# Patient Record
Sex: MALE | Race: WHITE | URBAN - METROPOLITAN AREA
[De-identification: names, ages, dates, MRNs, and addresses within clinical notes are randomized per-mention and may not be internally consistent; named-entity substitution may affect disease eponyms.]

---

## 2018-03-21 ENCOUNTER — TELEPHONE (OUTPATIENT)
Dept: DERMATOLOGY | Facility: CLINIC | Age: 28
End: 2018-03-21

## 2021-06-01 NOTE — PROGRESS NOTES
Assessment & Plan       ICD-10-CM    1. Elevated blood pressure reading without diagnosis of hypertension  R03.0 **Comprehensive metabolic panel FUTURE anytime     CBC with platelets     TSH with free T4 reflex     Hemoglobin A1c   2. Acute non intractable tension-type headache  G44.209      Differential diagnosis includes viral syndrome, sinusitis, headache such as tension or migrainous (most likely cause due to his high BP today). Other less likely ddx includes subarachnoid or epidural hemorrhage, CNS tumor, hydrocephalus, meningitis viral or bacterial, headache such as tension or migrainous, temporal arteritis, vertebral artery dissection, stroke, amongst others. Intercranial lesion or meninginitis unlikley given lack of neck stiffness and lack of localizing neurological signs and symptoms.  Subarachnoid or epidural hemorrhage is less likely due to the lack of recent trauma and not being on chronic anticoagulation. The patient was advised to continue with OTC medications for symptomatic relief. Present to the emergency room if symptoms persists despite medications or he noticed neurological symptoms.   For blood pressure, weight loss, dietary restrictions of sodium and increasing his physical activity was recommended.    - Comprehensive metabolic panel FUTURE anytime  - CBC with platelets  - TSH with free T4 reflex  - Hemoglobin A1c    Return in about 3 months (around 9/3/2021) for Physical Exam.    Ishmael Painting MD  [facility]    Deysi Mcclain is a 31 year old who presents for the following health issues     HPI     Headache  Onset/Duration: x2 weeks.  Description  Location: unilateral in the right side - radiates from top of head to occipital area to neck  Character: throbbing pain  Frequency:  2-3 times a day    Duration:  1-2 hours  Wake with headaches: YES- sometimes  Able to do daily activities when headache present: YES  Intensity:  mild  Progression of Symptoms:  same,  "intermittent  Accompanying signs and symptoms:  Stiff neck: YES  Neck or upper back pain: no  Sinus or URI symptoms no   Fever: no  Nausea or vomiting: no  Dizziness: no  Numbness/tingling: no  Weakness: no  Visual changes: none  History  Head trauma: no  Family history of migraines: YES- mother  Daily pain medication use: no  Previous tests for headaches: no  Neurologist evaluation: no  Precipitating or Alleviating factors (light/sound/sleep/caffeine): none  Therapies tried and outcome: Ibuprofen (Advil, Motrin),            Outcome - usually effective  Frequent/daily pain medication use: no  Pain is on the scale of 5-6/10.    Work has been stressful but this is not usual.     Gained 15 lbs.     Review of Systems   Constitutional, HEENT, cardiovascular, pulmonary, gi and gu systems are negative, except as otherwise noted.      Objective    /88 (Patient Position: Sitting, Cuff Size: Adult Regular)   Pulse 76   Temp 96.8  F (36  C) (Tympanic)   Resp 20   Ht 1.791 m (5' 10.5\")   Wt 88.8 kg (195 lb 12.8 oz)   SpO2 99%   BMI 27.70 kg/m    Body mass index is 27.7 kg/m .  Physical Exam   GENERAL: healthy, alert and no distress  RESP: lungs clear to auscultation - no rales, rhonchi or wheezes  CV: regular rate and rhythm, normal S1 S2, no S3 or S4, no murmur, click or rub, no peripheral edema and peripheral pulses strong  MS: no gross musculoskeletal defects noted, no edema    Results for orders placed or performed in visit on 06/03/21   **Comprehensive metabolic panel FUTURE anytime     Status: None   Result Value Ref Range    Sodium 138 133 - 144 mmol/L    Potassium 4.3 3.4 - 5.3 mmol/L    Chloride 103 94 - 109 mmol/L    Carbon Dioxide 29 20 - 32 mmol/L    Anion Gap 6 3 - 14 mmol/L    Glucose 88 70 - 99 mg/dL    Urea Nitrogen 10 7 - 30 mg/dL    Creatinine 0.94 0.66 - 1.25 mg/dL    GFR Estimate >90 >60 mL/min/[1.73_m2]    GFR Estimate If Black >90 >60 mL/min/[1.73_m2]    Calcium 9.3 8.5 - 10.1 mg/dL    " Bilirubin Total 1.0 0.2 - 1.3 mg/dL    Albumin 4.3 3.4 - 5.0 g/dL    Protein Total 7.7 6.8 - 8.8 g/dL    Alkaline Phosphatase 81 40 - 150 U/L    ALT 24 0 - 70 U/L    AST 14 0 - 45 U/L   CBC with platelets     Status: Abnormal   Result Value Ref Range    WBC 3.9 (L) 4.0 - 11.0 10e9/L    RBC Count 5.32 4.4 - 5.9 10e12/L    Hemoglobin 16.5 13.3 - 17.7 g/dL    Hematocrit 49.1 40.0 - 53.0 %    MCV 92 78 - 100 fl    MCH 31.0 26.5 - 33.0 pg    MCHC 33.6 31.5 - 36.5 g/dL    RDW 12.3 10.0 - 15.0 %    Platelet Count 250 150 - 450 10e9/L   TSH with free T4 reflex     Status: None   Result Value Ref Range    TSH 2.16 0.40 - 4.00 mU/L   Hemoglobin A1c     Status: None   Result Value Ref Range    Hemoglobin A1C 5.0 0 - 5.6 %

## 2021-06-03 ENCOUNTER — OFFICE VISIT (OUTPATIENT)
Dept: FAMILY MEDICINE | Facility: CLINIC | Age: 31
End: 2021-06-03
Payer: COMMERCIAL

## 2021-06-03 VITALS
RESPIRATION RATE: 20 BRPM | SYSTOLIC BLOOD PRESSURE: 128 MMHG | TEMPERATURE: 96.8 F | WEIGHT: 195.8 LBS | HEART RATE: 76 BPM | HEIGHT: 71 IN | DIASTOLIC BLOOD PRESSURE: 88 MMHG | OXYGEN SATURATION: 99 % | BODY MASS INDEX: 27.41 KG/M2

## 2021-06-03 DIAGNOSIS — R03.0 ELEVATED BLOOD PRESSURE READING WITHOUT DIAGNOSIS OF HYPERTENSION: Primary | ICD-10-CM

## 2021-06-03 DIAGNOSIS — G44.209 ACUTE NON INTRACTABLE TENSION-TYPE HEADACHE: ICD-10-CM

## 2021-06-03 LAB
ALBUMIN SERPL-MCNC: 4.3 G/DL (ref 3.4–5)
ALP SERPL-CCNC: 81 U/L (ref 40–150)
ALT SERPL W P-5'-P-CCNC: 24 U/L (ref 0–70)
ANION GAP SERPL CALCULATED.3IONS-SCNC: 6 MMOL/L (ref 3–14)
AST SERPL W P-5'-P-CCNC: 14 U/L (ref 0–45)
BILIRUB SERPL-MCNC: 1 MG/DL (ref 0.2–1.3)
BUN SERPL-MCNC: 10 MG/DL (ref 7–30)
CALCIUM SERPL-MCNC: 9.3 MG/DL (ref 8.5–10.1)
CHLORIDE SERPL-SCNC: 103 MMOL/L (ref 94–109)
CO2 SERPL-SCNC: 29 MMOL/L (ref 20–32)
CREAT SERPL-MCNC: 0.94 MG/DL (ref 0.66–1.25)
ERYTHROCYTE [DISTWIDTH] IN BLOOD BY AUTOMATED COUNT: 12.3 % (ref 10–15)
GFR SERPL CREATININE-BSD FRML MDRD: >90 ML/MIN/{1.73_M2}
GLUCOSE SERPL-MCNC: 88 MG/DL (ref 70–99)
HBA1C MFR BLD: 5 % (ref 0–5.6)
HCT VFR BLD AUTO: 49.1 % (ref 40–53)
HGB BLD-MCNC: 16.5 G/DL (ref 13.3–17.7)
MCH RBC QN AUTO: 31 PG (ref 26.5–33)
MCHC RBC AUTO-ENTMCNC: 33.6 G/DL (ref 31.5–36.5)
MCV RBC AUTO: 92 FL (ref 78–100)
PLATELET # BLD AUTO: 250 10E9/L (ref 150–450)
POTASSIUM SERPL-SCNC: 4.3 MMOL/L (ref 3.4–5.3)
PROT SERPL-MCNC: 7.7 G/DL (ref 6.8–8.8)
RBC # BLD AUTO: 5.32 10E12/L (ref 4.4–5.9)
SODIUM SERPL-SCNC: 138 MMOL/L (ref 133–144)
TSH SERPL DL<=0.005 MIU/L-ACNC: 2.16 MU/L (ref 0.4–4)
WBC # BLD AUTO: 3.9 10E9/L (ref 4–11)

## 2021-06-03 PROCEDURE — 99203 OFFICE O/P NEW LOW 30 MIN: CPT | Performed by: FAMILY MEDICINE

## 2021-06-03 PROCEDURE — 84443 ASSAY THYROID STIM HORMONE: CPT | Performed by: FAMILY MEDICINE

## 2021-06-03 PROCEDURE — 83036 HEMOGLOBIN GLYCOSYLATED A1C: CPT | Performed by: FAMILY MEDICINE

## 2021-06-03 PROCEDURE — 85027 COMPLETE CBC AUTOMATED: CPT | Performed by: FAMILY MEDICINE

## 2021-06-03 PROCEDURE — 36415 COLL VENOUS BLD VENIPUNCTURE: CPT | Performed by: FAMILY MEDICINE

## 2021-06-03 PROCEDURE — 80053 COMPREHEN METABOLIC PANEL: CPT | Performed by: FAMILY MEDICINE

## 2021-06-03 ASSESSMENT — MIFFLIN-ST. JEOR: SCORE: 1857.33

## 2021-06-03 NOTE — RESULT ENCOUNTER NOTE
Dear Johny,   Your WBC is 0.1 bellow normal. This is considered normal and it does not require any additional testing.     Ishmael Painting MD

## 2021-06-03 NOTE — PATIENT INSTRUCTIONS
Patient Education     Low-Salt Choices  Eating salt (sodium) can make your body retain too much water. Extra water makes your heart work harder. Canned, packaged, and frozen foods are easy to prepare. But they are often high in sodium. Here are some ideas for low-salt foods you can easily make yourself.   For breakfast    Fruit or 100% fruit juice. It's better to have whole fruit instead of 100% fruit juice.    Whole-wheat bread or an English muffin. Look for sodium content on Nutrition Facts labels.    Low-fat milk or yogurt    Unsalted eggs    Shredded wheat    Corn tortillas    Unsalted steamed rice    Regular (not instant) hot cereal, made without salt    Stay away from    Sausage, browne, and ham    Flour tortillas    Packaged muffins, pancakes, and biscuits    Instant hot cereals    Cottage cheese    For lunch and dinner    Fresh fish, chicken, turkey, or meat--baked, broiled, or roasted without salt    Dry beans, cooked without salt    Tofu, stir-fried without salt    Unsalted fresh fruit and vegetables, or frozen or canned fruit and vegetables with no added salt  Stay away from    Lunch or deli meat that is cured or smoked    Cheese    Tomato juice and ketchup    Canned vegetables, soups, and fish not labeled as no-salt-added or reduced sodium    Packaged gravies and sauces    Olives, pickles, and relish    Bottled salad dressings    For snacks and desserts    Yogurt    Unsalted, air-popped popcorn    Unsalted nuts or seeds  Stay away from    Pies and cakes    Packaged dessert mixes    Pizza    Canned and packaged puddings    Pretzels, chips, crackers, and nuts--unless the label says unsalted    [employer] last reviewed this educational content on 11/1/2019 2000-2021 [employer]. All rights reserved. This information is not intended as a substitute for professional medical care. Always follow your healthcare professional's instructions.           Patient Education     Managing High Blood  Pressure with the DASH Diet  What you eat can help lower your blood pressure and reduce your risk for stroke and heart disease.  One such diet, the Dietary Approaches to Stop Hypertension (DASH) diet, has been shown to reduce blood pressure. This diet is low in saturated fat, cholesterol, and total fat. The diet emphasizes fruits, vegetables, and low-fat dairy products.  Your blood pressure can be unhealthy even if it stays only slightly above 120/80 mm Hg. The higher above that level, the greater your health risk. Over time, high blood pressure makes your heart work too hard. This can cause stroke, heart disease, heart failure, kidney disease, and even blindness.  Blood pressure measurements are given as 2 numbers. Systolic blood pressure is the upper number. This is the pressure when the heart contracts. Diastolic blood pressure is the lower number. This is the pressure when the heart relaxes between beats. Blood pressure is categorized as normal, elevated, or stage 1 or stage 2 high blood pressure:    Normal blood pressure is systolic of less than 120 and diastolic of less than 80 (120/80)    Elevated blood pressure is systolic of 120 to 129 and diastolic less than 80    Stage 1 high blood pressure is systolic is 130 to 139 or diastolic between 80 to 89    Stage 2 high blood pressure is when systolic is 140 or higher or the diastolic is 90 or higher  High blood pressure is diagnosed when multiple, separate readings show blood pressure above 130/80 mmHg. Talk with your healthcare provider if you have questions or concerns about your blood pressure readings.  Why this diet works  Why is the DASH diet so effective at reducing blood pressure? It combines many nutrients that have been shown to help reduce blood pressure. Those nutrients include calcium, potassium, magnesium, protein, and fiber, as well as lower total fat and saturated fat.  The DASH diet is naturally low in salt. The DASH diet recommends menus  containing 2,300 mg of sodium. The lower sodium DASH diet contains up to 1,500 mg of sodium a day. (One teaspoon of salt contains 2,300 mg of sodium.)   Further, following the DASH diet may delay your need to take blood pressure lowering medicine, and may even keep you from needing to take it at all. And if you're already on medicine, it may help you reduce the amount you take.  Doing the DASH  The DASH diet is a 2,000-calorie diet that includes:    Six to 8 daily servings of grains and grain products, such as whole-wheat bread, cereal, oatmeal, crackers, unsalted pretzels, and popcorn. A serving size is 1 slice of bread, 1 cup of ready-to-eat cereal, or a half-cup of rice, pasta, or cereal.    Four to 5 daily servings of vegetables, the darker in color, the better. A serving size is 1 cup of raw leafy vegetables, a half-cup of cooked vegetables, or 6 ounces of vegetable juice.    Four to 5 daily servings of fruit. A serving is 1 medium fruit, quarter-cup of dried fruit, half-cup of fresh, frozen or canned fruit, or 6 ounces of fruit juice.    Two or 3 daily servings of low-fat or fat-free dairy products. A serving is 8 ounces of milk, 1 cup of yogurt, or 1  ounces of cheese.    Six or fewer daily servings of lean meat, poultry, or fish. A serving is 1 ounce of cooked meats, skinless poultry, or fish.    Four to 5 servings per week of nuts, seeds, and dry beans. A serving is one-third cup or 1  ounces of nuts, 1 tablespoon or half-ounce of seeds, or half-cup cooked dried beans.    Two to 3 small daily servings of fats and oils like olive oil and low-fat salad dressing. A serving is 1 teaspoon soft margarine, 1 tablespoon low-fat mayonnaise, 2 tablespoons light salad dressing, or 1 teaspoon vegetable oil. Don't have fats that are saturated (animal fats) or trans fats.    Five or fewer servings per week of sweets like maple syrup, sorbet, or gelatin. A serving is 1 tablespoon sugar, 1 tablespoon jelly or jam,  "half-ounce jellybeans, or 8 ounces of lemonade.  Although the DASH diet isn't designed for weight loss, it can promote it if you reduce the number of servings you eat. Most of the food the diet features is big on volume and low in calories.  Still, there are parts of the DASH diet that may not be easy to follow. For one, it's packed with dark-colored fruits and vegetables, so be prepared to be choosier at the supermarket. Also, if it's very different from what you normally eat, it may be hard to adjust.  If you're serious about following the diet, it's a good idea to work with a registered dietitian (RD) for support and guidance. (For the names of RDs in your area who know about the DASH diet, visit the Academy of [employer] and Dietetics.)  Moving forward  If you decide to go it alone, adopt the DASH diet gradually. By doing so, you'll be more likely to stick to it long-term. For example, add 1 more serving of vegetables at lunch and dinner if you eat only 1 or 2 servings a day now. You might also add fruit to meals and snacks if you now only have juice for breakfast. In addition, slowly increase your dairy products to 3 servings per day. Try drinking skim milk with lunch or dinner, instead of soda, alcohol, or tea.  To get the most out of the DASH, lose weight if you need to and exercise regularly. Thirty to 40 minutes of moderate to vigorous intensity aerobic exercise 4 to 5 days a week is recommended.   More dish on the DASH  \"The DASH Eating Plan\" is a 24-page online guide published by the [employer]. It offers a reader-friendly explanation of high blood pressure, detailed daily servings charts to help you plan your menus, a week of suggested DASH menus, plus tips to reduce sodium.  For more information about diet and other lifestyle factors to reduce hypertension, visit Your Guide to Lowering Blood Pressure.    3737-3377 [employer]. All rights reserved. This information is not intended as a substitute " for professional medical care. Always follow your healthcare professional's instructions.

## 2021-06-13 ENCOUNTER — HEALTH MAINTENANCE LETTER (OUTPATIENT)
Age: 31
End: 2021-06-13

## 2021-10-03 ENCOUNTER — HEALTH MAINTENANCE LETTER (OUTPATIENT)
Age: 31
End: 2021-10-03

## 2022-07-10 ENCOUNTER — HEALTH MAINTENANCE LETTER (OUTPATIENT)
Age: 32
End: 2022-07-10

## 2022-09-11 ENCOUNTER — HEALTH MAINTENANCE LETTER (OUTPATIENT)
Age: 32
End: 2022-09-11

## 2023-07-23 ENCOUNTER — HEALTH MAINTENANCE LETTER (OUTPATIENT)
Age: 33
End: 2023-07-23